# Patient Record
Sex: FEMALE | Race: WHITE | Employment: UNEMPLOYED | URBAN - METROPOLITAN AREA
[De-identification: names, ages, dates, MRNs, and addresses within clinical notes are randomized per-mention and may not be internally consistent; named-entity substitution may affect disease eponyms.]

---

## 2022-09-08 ENCOUNTER — TELEPHONE (OUTPATIENT)
Dept: INTERNAL MEDICINE CLINIC | Age: 42
End: 2022-09-08

## 2022-09-08 NOTE — TELEPHONE ENCOUNTER
Patient called about ulcer concerns. She stated that she has a history of ulcers and she has all of the associated symptoms. She also stated that she has a severe head cold. Her symptoms include excessive mucus, and upset stomach. Patient also said that she tested negative for COVID-19. Scheduled a car visit appointment for 9/12/22.

## 2022-10-11 ENCOUNTER — OFFICE VISIT (OUTPATIENT)
Dept: INTERNAL MEDICINE CLINIC | Age: 42
End: 2022-10-11
Payer: COMMERCIAL

## 2022-10-11 VITALS
OXYGEN SATURATION: 99 % | WEIGHT: 159.2 LBS | RESPIRATION RATE: 16 BRPM | BODY MASS INDEX: 28.21 KG/M2 | SYSTOLIC BLOOD PRESSURE: 130 MMHG | HEIGHT: 63 IN | DIASTOLIC BLOOD PRESSURE: 84 MMHG | TEMPERATURE: 98.4 F | HEART RATE: 80 BPM

## 2022-10-11 DIAGNOSIS — R13.10 PAINFUL SWALLOWING: ICD-10-CM

## 2022-10-11 DIAGNOSIS — F17.200 CURRENT EVERY DAY SMOKER: ICD-10-CM

## 2022-10-11 DIAGNOSIS — Z13.1 SCREENING FOR DIABETES MELLITUS: ICD-10-CM

## 2022-10-11 DIAGNOSIS — Z86.69 H/O ABSENCE SEIZURES: ICD-10-CM

## 2022-10-11 DIAGNOSIS — R53.83 FATIGUE, UNSPECIFIED TYPE: ICD-10-CM

## 2022-10-11 DIAGNOSIS — Z13.220 SCREENING FOR LIPID DISORDERS: ICD-10-CM

## 2022-10-11 DIAGNOSIS — K92.2 CHRONIC GI BLEEDING: Primary | ICD-10-CM

## 2022-10-11 PROCEDURE — 99204 OFFICE O/P NEW MOD 45 MIN: CPT | Performed by: NURSE PRACTITIONER

## 2022-10-11 RX ORDER — MONTELUKAST SODIUM 10 MG/1
10 TABLET ORAL DAILY
COMMUNITY

## 2022-10-11 RX ORDER — EPINEPHRINE 0.3 MG/.3ML
0.3 INJECTION SUBCUTANEOUS ONCE
Status: DISCONTINUED | OUTPATIENT
Start: 2022-10-11 | End: 2022-10-11

## 2022-10-11 RX ORDER — EPINEPHRINE 0.3 MG/.3ML
0.3 INJECTION SUBCUTANEOUS
Qty: 1 EACH | Refills: 1 | Status: SHIPPED | OUTPATIENT
Start: 2022-10-11 | End: 2022-10-11

## 2022-10-11 RX ORDER — PANTOPRAZOLE SODIUM 20 MG/1
20 TABLET, DELAYED RELEASE ORAL 2 TIMES DAILY
COMMUNITY

## 2022-10-11 RX ORDER — SUCRALFATE 1 G/10ML
SUSPENSION ORAL 4 TIMES DAILY
COMMUNITY

## 2022-10-11 NOTE — PROGRESS NOTES
Rj Castellanos is a 38 y.o. female  Chief Complaint   Patient presents with    New Patient     Patient thinks ulcers are bleeding - she has a history of that the past 13 years. Gastric bypass in 2009. Patient says she has black stools and pain in the abdomen. Ulcers in esophagus -painful to swallow. Patient said she has been throwing up. Patient cannot tolerate pain anymore    Medication Refill     Patient requesting epi-pen     Visit Vitals  BP (!) 134/91 (BP 1 Location: Left upper arm, BP Patient Position: Sitting, BP Cuff Size: Adult)   Pulse 80   Temp 98.4 °F (36.9 °C) (Temporal)   Resp 16   Ht 5' 3\" (1.6 m)   Wt 159 lb 3.2 oz (72.2 kg)   SpO2 99%   BMI 28.20 kg/m²          HPI  Patient is here to establish care  Moved here in Jan.   from [place]. Abdominal Pain started  2 months ago. ABDOMINAL PAIN- She reports severe abdominal pain 9/10. Rectal and upper GI bleeding, esophageal ulcers. Vomiting daily, Undigested food. Is currently on Carafate, Omeprazole and Protonix. On  x 8 yrs. Has not been stable. Cannot swallow. Painful ulcers. \"  Pain located  epigastric. Described as stabbing/ twisting. Several weeks of black greasy stool. No diarrhea or constipation. History  of gastric bypass 2009. Hospitalized for bleeding ulcer. 2 yrs ago. Attributed to Red bull/ stress       Current every day smoker. 1/2 ppd. No desire to quit today. Hypoglycemic-leads to   \"Absence seizure\"  passes out in shower. Never saw endocrinology RE BS. Zehra Dickerson Last seizure 8 months ago. Witnessed. Out for  minutes. No loss of bowel or bladder. No neuro evaluation      Request for EPI pen for allergy to shellfish. Being around shellfish. Exposure -Last month EMS called. 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 [employer]      ODALYS  Review of Systems   Constitutional:  Positive for malaise/fatigue. Negative for chills and fever. Respiratory:  Negative for cough and shortness of breath. Cardiovascular:  Negative for chest pain and palpitations. Gastrointestinal:  Positive for blood in stool, heartburn, melena, nausea and vomiting. Negative for abdominal pain, constipation and diarrhea. Genitourinary:  Negative for dysuria. Musculoskeletal:  Negative for myalgias. Skin:  Negative for itching and rash. Neurological:  Negative for dizziness and headaches. Psychiatric/Behavioral:  Negative for depression. The patient is not nervous/anxious. Past Medical History:   Diagnosis Date    Anemia     GERD (gastroesophageal reflux disease)     History of stomach ulcers 2010    Seizures ([address]      Past Surgical History:   Procedure Laterality Date    HX GASTRIC BYPASS  2009    HX TUBAL LIGATION  2008    IR CHOLECYSTOSTOMY PERCUTANEOUS  2001       EXAM  Physical Exam  Vitals and nursing note reviewed. Constitutional:       General: She is not in acute distress. Appearance: Normal appearance. HENT:      Head: Normocephalic and atraumatic. Right Ear: Tympanic membrane and ear canal normal.      Left Ear: Tympanic membrane and ear canal normal.      Mouth/Throat:      Mouth: Mucous membranes are moist.   Eyes:      Pupils: Pupils are equal, round, and reactive to light. Cardiovascular:      Rate and Rhythm: Normal rate and regular rhythm. Pulses: Normal pulses. Heart sounds: Normal heart sounds. Pulmonary:      Effort: Pulmonary effort is normal.      Breath sounds: Normal breath sounds. No wheezing or rhonchi. Abdominal:      General: Abdomen is protuberant. Bowel sounds are normal. There is no distension. Genitourinary:     Rectum: Guaiac result negative. External hemorrhoid present. Musculoskeletal:      Cervical back: Normal range of motion and neck supple. No tenderness. Lymphadenopathy:      Cervical: No cervical adenopathy. Skin:     General: Skin is warm.    Neurological:      General: No focal deficit present. Mental Status: She is alert. Psychiatric:         Mood and Affect: Mood normal.     ASSESSMENT/PLAN    1. Chronic GI bleeding  Continue current regimen of omeprazole/protonix/ carafate  - REFERRAL TO GASTROENTEROLOGY  - HEPATITIS C AB; Future  - AMB POC FECAL BLOOD, OCCULT, QL 1 CARD  Negative fecal occult blood    2. Painful swallowing  GI referral    3. Fatigue, unspecified type  - CBC WITH AUTOMATED DIFF; Future  - TSH 3RD GENERATION; Future  - HEPATITIS C AB; Future    4. Current every day smoker  Discussed cessation/ counseling given    5. Screening for diabetes mellitus  - METABOLIC PANEL, COMPREHENSIVE; Future    6. H/O absence seizures  stable  Consider neurology referral if reoccurs    7. Screening for lipid disorders  - LIPID PANEL;  Future    If symptoms get worse or you cannot get in with GI in next week/ go to ER for evaluation and treatment  Schedule Women's Health Visit PAP/ mammogram  Return 1 month  Signed By: JOLIE Camargo     October 11, 2022

## 2022-10-11 NOTE — PROGRESS NOTES
Chief Complaint   Patient presents with    New Patient     Patient thinks ulcers are bleeding - she has a history of that the past 13 years. Gastric bypass in 2009. Patient says she has black stools and pain in the abdomen. Ulcers in esophagus -painful to swallow. Patient said she has been throwing up. Patient cannot tolerate pain anymore    Medication Refill     Patient requesting epi-pen          Vitals:    10/11/22 1307   BP: (!) 134/91   Pulse: 80   Resp: 16   Temp: 98.4 °F (36.9 °C)   TempSrc: Temporal   SpO2: 99%   Weight: 159 lb 3.2 oz (72.2 kg)   Height: 5' 3\" (1.6 m)   PainSc:   9   PainLoc: Abdomen   LMP: 09/29/2022       Current Outpatient Medications on File Prior to Visit   Medication Sig Dispense Refill    sucralfate (Carafate) 100 mg/mL suspension Take  by mouth four (4) times daily. pantoprazole (Protonix) 20 mg tablet Take 20 mg by mouth two (2) times a day. OMEPRAZOLE PO Take 40 mg by mouth daily. montelukast (SINGULAIR) 10 mg tablet Take 10 mg by mouth daily. No current facility-administered medications on file prior to visit. Health Maintenance Due   Topic    Hepatitis C Screening     Depression Screen     COVID-19 Vaccine (1)    DTaP/Tdap/Td series (1 - Tdap)    Cervical cancer screen     Lipid Screen     Flu Vaccine (1)       1. \"Have you been to the ER, urgent care clinic since your last visit? Hospitalized since your last visit? \" No    2. \"Have you seen or consulted any other health care providers outside of the [address] [place] [address] since your last visit? \" No     3. For patients aged 39-70: Has the patient had a colonoscopy / FIT/ Cologuard? YES      If the patient is female:    4. For patients aged 41-77: Has the patient had a mammogram within the past 2 years? No      5. For patients aged 21-65: Has the patient had a pap smear?  No

## 2022-10-22 LAB
ALBUMIN SERPL-MCNC: 4.8 G/DL (ref 3.8–4.8)
ALBUMIN/GLOB SERPL: 2.3 {RATIO} (ref 1.2–2.2)
ALP SERPL-CCNC: 58 IU/L (ref 44–121)
ALT SERPL-CCNC: 11 IU/L (ref 0–32)
AST SERPL-CCNC: 28 IU/L (ref 0–40)
BASOPHILS # BLD AUTO: 0.1 X10E3/UL (ref 0–0.2)
BASOPHILS NFR BLD AUTO: 1 %
BILIRUB SERPL-MCNC: 0.5 MG/DL (ref 0–1.2)
BUN SERPL-MCNC: 4 MG/DL (ref 6–24)
BUN/CREAT SERPL: 4 (ref 9–23)
CALCIUM SERPL-MCNC: 9.2 MG/DL (ref 8.7–10.2)
CHLORIDE SERPL-SCNC: 104 MMOL/L (ref 96–106)
CHOLEST SERPL-MCNC: 170 MG/DL (ref 100–199)
CO2 SERPL-SCNC: 22 MMOL/L (ref 20–29)
CREAT SERPL-MCNC: 0.9 MG/DL (ref 0.57–1)
EGFR: 81 ML/MIN/1.73
EOSINOPHIL # BLD AUTO: 0.5 X10E3/UL (ref 0–0.4)
EOSINOPHIL NFR BLD AUTO: 7 %
ERYTHROCYTE [DISTWIDTH] IN BLOOD BY AUTOMATED COUNT: 16.1 % (ref 11.7–15.4)
GLOBULIN SER CALC-MCNC: 2.1 G/DL (ref 1.5–4.5)
GLUCOSE SERPL-MCNC: 95 MG/DL (ref 70–99)
HCT VFR BLD AUTO: 30.5 % (ref 34–46.6)
HCV AB S/CO SERPL IA: <0.1 S/CO RATIO (ref 0–0.9)
HDLC SERPL-MCNC: 60 MG/DL
HGB BLD-MCNC: 8.4 G/DL (ref 11.1–15.9)
IMM GRANULOCYTES # BLD AUTO: 0 X10E3/UL (ref 0–0.1)
IMM GRANULOCYTES NFR BLD AUTO: 0 %
IMP & REVIEW OF LAB RESULTS: NORMAL
LDLC SERPL CALC-MCNC: 91 MG/DL (ref 0–99)
LYMPHOCYTES # BLD AUTO: 2.1 X10E3/UL (ref 0.7–3.1)
LYMPHOCYTES NFR BLD AUTO: 32 %
MCH RBC QN AUTO: 19 PG (ref 26.6–33)
MCHC RBC AUTO-ENTMCNC: 27.5 G/DL (ref 31.5–35.7)
MCV RBC AUTO: 69 FL (ref 79–97)
MONOCYTES # BLD AUTO: 0.4 X10E3/UL (ref 0.1–0.9)
MONOCYTES NFR BLD AUTO: 6 %
NEUTROPHILS # BLD AUTO: 3.4 X10E3/UL (ref 1.4–7)
NEUTROPHILS NFR BLD AUTO: 54 %
PLATELET # BLD AUTO: 449 X10E3/UL (ref 150–450)
POTASSIUM SERPL-SCNC: 4.5 MMOL/L (ref 3.5–5.2)
PROT SERPL-MCNC: 6.9 G/DL (ref 6–8.5)
RBC # BLD AUTO: 4.43 X10E6/UL (ref 3.77–5.28)
SODIUM SERPL-SCNC: 138 MMOL/L (ref 134–144)
TRIGL SERPL-MCNC: 108 MG/DL (ref 0–149)
TSH SERPL DL<=0.005 MIU/L-ACNC: 0.79 UIU/ML (ref 0.45–4.5)
VLDLC SERPL CALC-MCNC: 19 MG/DL (ref 5–40)
WBC # BLD AUTO: 6.5 X10E3/UL (ref 3.4–10.8)

## 2022-11-11 ENCOUNTER — OFFICE VISIT (OUTPATIENT)
Dept: INTERNAL MEDICINE CLINIC | Age: 42
End: 2022-11-11
Payer: COMMERCIAL

## 2022-11-11 VITALS
BODY MASS INDEX: 28.63 KG/M2 | RESPIRATION RATE: 20 BRPM | TEMPERATURE: 98.2 F | OXYGEN SATURATION: 98 % | HEIGHT: 63 IN | SYSTOLIC BLOOD PRESSURE: 132 MMHG | HEART RATE: 103 BPM | DIASTOLIC BLOOD PRESSURE: 90 MMHG | WEIGHT: 161.6 LBS

## 2022-11-11 DIAGNOSIS — H53.9 VISION ABNORMALITIES: ICD-10-CM

## 2022-11-11 DIAGNOSIS — F17.200 CURRENT EVERY DAY SMOKER: ICD-10-CM

## 2022-11-11 DIAGNOSIS — R03.0 ELEVATED BLOOD-PRESSURE READING WITHOUT DIAGNOSIS OF HYPERTENSION: ICD-10-CM

## 2022-11-11 DIAGNOSIS — K92.2 CHRONIC GI BLEEDING: Primary | ICD-10-CM

## 2022-11-11 PROCEDURE — 99214 OFFICE O/P EST MOD 30 MIN: CPT | Performed by: NURSE PRACTITIONER

## 2022-11-11 RX ORDER — SUCRALFATE 1 G/10ML
1 SUSPENSION ORAL 4 TIMES DAILY
Qty: 1200 ML | Refills: 0 | Status: SHIPPED | OUTPATIENT
Start: 2022-11-11 | End: 2022-12-11

## 2022-11-11 RX ORDER — EPINEPHRINE 0.3 MG/.3ML
INJECTION SUBCUTANEOUS
COMMUNITY
Start: 2022-10-14

## 2022-11-11 NOTE — PROGRESS NOTES
Patient here for follow up, bloodwork. No chief complaint on file. There were no vitals filed for this visit. Current Outpatient Medications on File Prior to Visit   Medication Sig Dispense Refill    sucralfate (Carafate) 100 mg/mL suspension Take  by mouth four (4) times daily. pantoprazole (Protonix) 20 mg tablet Take 20 mg by mouth two (2) times a day. OMEPRAZOLE PO Take 40 mg by mouth daily. montelukast (SINGULAIR) 10 mg tablet Take 10 mg by mouth daily. No current facility-administered medications on file prior to visit. Health Maintenance Due   Topic    COVID-19 Vaccine (1)    Pneumococcal 0-64 years (1 - PCV)    DTaP/Tdap/Td series (1 - Tdap)    Cervical cancer screen     Flu Vaccine (1)       1. \"Have you been to the ER, urgent care clinic since your last visit? Hospitalized since your last visit? \"  Yes, [facility], [facility], Eye Problem    2. \"Have you seen or consulted any other health care providers outside of the [address] [place] [address] since your last visit? \" No     3. For patients aged 39-70: Has the patient had a colonoscopy / FIT/ Cologuard? No      If the patient is female:    4. For patients aged 41-77: Has the patient had a mammogram within the past 2 years? No      5. For patients aged 21-65: Has the patient had a pap smear?  No

## 2022-11-11 NOTE — PROGRESS NOTES
Luisa Sweeney is a 38 y.o. female  No chief complaint on file. Visit Vitals  BP (!) 138/94 (BP 1 Location: Left upper arm, BP Patient Position: Sitting, BP Cuff Size: Adult)   Pulse (!) 103   Temp 98.2 °F (36.8 °C) (Oral)   Resp 20   Ht 5' 3\" (1.6 m)   Wt 161 lb 9.6 oz (73.3 kg)   SpO2 98%   BMI 28.63 kg/m²          HPI  Patient here with   Last visit was having severe abdominal pain / bleeding. Was referred to GI but never called. Pain lessened. 3 weeks ago  10/20 she reports she was at work . Couldn't see. Does wear contacts. [employer] [place]. Could not see computer. Went to her  eye doctor. Was told to get checked for diabetes. Dilated eye exam normal..  She states her Prescription went  severely worse. Went to walk in center/ [facility]- was told to see opthalmology  Had blood work done. Normal.  10/20 . Back to regular now but it Lasted 4  days. Other symptoms were Headache. Elevated blood pressure. Was not referred at [facility].   Headaches come and go. Located temples. Does not take anything. Currently headache free. She reports extreme fatigue. Reviewed labs. Elevated blood pressure-  Smoker/ does not want to quit  Health maintenance to be reviewed later    ROS  Review of Systems   Constitutional:  Positive for malaise/fatigue. HENT:  Negative for congestion and sore throat. Eyes:  Positive for blurred vision. Negative for photophobia and pain. Respiratory:  Negative for cough. Cardiovascular:  Negative for chest pain and palpitations. Gastrointestinal:  Positive for constipation and diarrhea. Negative for nausea and vomiting. Musculoskeletal:  Negative for myalgias. Neurological:  Positive for headaches. Negative for dizziness. EXAM  Physical Exam  Vitals and nursing note reviewed. Constitutional:       General: She is not in acute distress. Appearance: Normal appearance. HENT:      Head: Normocephalic and atraumatic.       Nose: Nose normal.   Eyes:      Pupils: Pupils are equal, round, and reactive to light. Cardiovascular:      Rate and Rhythm: Regular rhythm. Tachycardia present. Heart sounds: Normal heart sounds. Pulmonary:      Effort: Pulmonary effort is normal.      Breath sounds: No wheezing or rhonchi. Abdominal:      Palpations: Abdomen is soft. Musculoskeletal:         General: Normal range of motion. Cervical back: Normal range of motion. No tenderness. Lymphadenopathy:      Cervical: No cervical adenopathy. Skin:     General: Skin is warm. Neurological:      General: No focal deficit present. Mental Status: She is alert. Psychiatric:         Mood and Affect: Mood normal.   Lab Results   Component Value Date/Time    HGB 8.4 (L) 10/21/2022 10:32 AM       ASSESSMENT/PLAN        ICD-10-CM ICD-9-CM    1. Chronic GI bleeding  K92.2 578.9 CBC WITH AUTOMATED DIFF      IRON PROFILE  Call GI today for appt      2. Current every day smoker  F17.200 305.1 Will address next visit again      3. Elevated blood-pressure reading without diagnosis of hypertension  R03.0 796.2 Take BP at home and record  Return 1 month. If above 130/70 we are starting medication      4. Vision abnormalities  H53.9 368.9 Resolved.  If this re-occurs/ go to ER      RETURN 1 MONTH  Signed By: JOLIE Calle     November 11, 2022

## 2022-11-18 LAB
BASOPHILS # BLD AUTO: 0.1 X10E3/UL (ref 0–0.2)
BASOPHILS NFR BLD AUTO: 1 %
EOSINOPHIL # BLD AUTO: 0.1 X10E3/UL (ref 0–0.4)
EOSINOPHIL NFR BLD AUTO: 2 %
ERYTHROCYTE [DISTWIDTH] IN BLOOD BY AUTOMATED COUNT: 16 % (ref 11.7–15.4)
HCT VFR BLD AUTO: 31.7 % (ref 34–46.6)
HGB BLD-MCNC: 8.4 G/DL (ref 11.1–15.9)
IMM GRANULOCYTES # BLD AUTO: 0 X10E3/UL (ref 0–0.1)
IMM GRANULOCYTES NFR BLD AUTO: 0 %
IRON SATN MFR SERPL: 4 % (ref 15–55)
IRON SERPL-MCNC: 17 UG/DL (ref 27–159)
LYMPHOCYTES # BLD AUTO: 2.2 X10E3/UL (ref 0.7–3.1)
LYMPHOCYTES NFR BLD AUTO: 31 %
MCH RBC QN AUTO: 18.4 PG (ref 26.6–33)
MCHC RBC AUTO-ENTMCNC: 26.5 G/DL (ref 31.5–35.7)
MCV RBC AUTO: 70 FL (ref 79–97)
MONOCYTES # BLD AUTO: 0.4 X10E3/UL (ref 0.1–0.9)
MONOCYTES NFR BLD AUTO: 5 %
NEUTROPHILS # BLD AUTO: 4.3 X10E3/UL (ref 1.4–7)
NEUTROPHILS NFR BLD AUTO: 61 %
PLATELET # BLD AUTO: 366 X10E3/UL (ref 150–450)
RBC # BLD AUTO: 4.56 X10E6/UL (ref 3.77–5.28)
TIBC SERPL-MCNC: 460 UG/DL (ref 250–450)
UIBC SERPL-MCNC: 443 UG/DL (ref 131–425)
WBC # BLD AUTO: 7 X10E3/UL (ref 3.4–10.8)

## 2022-11-21 NOTE — PROGRESS NOTES
Spoke with Khloe Gilmore she stated she has appointment with GI on Jan 25, 2023, that was the earliest. She understood she should make appointment to see hematologist and if she feels worst to go to the ER.

## 2022-11-21 NOTE — PROGRESS NOTES
Please call patient. .. Please make sure she made appt with GI. Her Hgb is still very low. Has not changed from last month. She should also make an appointment with a hematologist as well. If she is feeling worse. .. GO to [facility]

## 2022-12-14 ENCOUNTER — OFFICE VISIT (OUTPATIENT)
Dept: INTERNAL MEDICINE CLINIC | Age: 42
End: 2022-12-14
Payer: COMMERCIAL

## 2022-12-14 VITALS
BODY MASS INDEX: 28.35 KG/M2 | OXYGEN SATURATION: 97 % | HEART RATE: 64 BPM | TEMPERATURE: 97.7 F | WEIGHT: 160 LBS | DIASTOLIC BLOOD PRESSURE: 80 MMHG | SYSTOLIC BLOOD PRESSURE: 138 MMHG | HEIGHT: 63 IN

## 2022-12-14 DIAGNOSIS — F17.200 CURRENT EVERY DAY SMOKER: ICD-10-CM

## 2022-12-14 DIAGNOSIS — B34.9 VIRAL ILLNESS: ICD-10-CM

## 2022-12-14 DIAGNOSIS — K92.2 CHRONIC GI BLEEDING: Primary | ICD-10-CM

## 2022-12-14 DIAGNOSIS — D50.0 IRON DEFICIENCY ANEMIA DUE TO CHRONIC BLOOD LOSS: ICD-10-CM

## 2022-12-14 DIAGNOSIS — I10 ESSENTIAL HYPERTENSION: ICD-10-CM

## 2022-12-14 LAB
QUICKVUE INFLUENZA TEST: NEGATIVE
VALID INTERNAL CONTROL?: YES

## 2022-12-14 PROCEDURE — 87804 INFLUENZA ASSAY W/OPTIC: CPT | Performed by: NURSE PRACTITIONER

## 2022-12-14 PROCEDURE — 99214 OFFICE O/P EST MOD 30 MIN: CPT | Performed by: NURSE PRACTITIONER

## 2022-12-14 RX ORDER — FERROUS SULFATE 325(65) MG
325 TABLET, DELAYED RELEASE (ENTERIC COATED) ORAL
Qty: 90 TABLET | Refills: 2 | Status: SHIPPED | OUTPATIENT
Start: 2022-12-14 | End: 2023-01-13

## 2022-12-14 RX ORDER — ALBUTEROL SULFATE 90 UG/1
AEROSOL, METERED RESPIRATORY (INHALATION)
COMMUNITY
Start: 2022-11-25

## 2022-12-14 NOTE — PROGRESS NOTES
Carmela Tabor is a 38 y.o. female  Chief Complaint   Patient presents with    Follow-up     Visit Vitals  /78 (BP 1 Location: Left upper arm, BP Patient Position: Sitting, BP Cuff Size: Adult)   Pulse 64   Temp 97.7 °F (36.5 °C) (Temporal)   Ht 5' 3\" (1.6 m)   Wt 160 lb (72.6 kg)   SpO2 97%   BMI 28.34 kg/m²          HPI  Patient is here for follow up of blood pressure and anemia  Reviewed last labs  Lab Results   Component Value Date/Time    HGB 8.4 (L) 11/17/2022 10:22 AM     Lab Results   Component Value Date/Time    Iron 17 (L) 11/17/2022 10:22 AM    TIBC 460 (H) 11/17/2022 10:22 AM    Iron % saturation 4 (LL) 11/17/2022 10:22 AM     Patient has 2 appts in Jan 6th and 26th with GI. Attempted to get appt with hematology. \"Wanted a referral\"  Patient reports she had iron and blood transfusions in [place]. Hypertension-  Taking blood pressure at home 3 times a day. 140/70-90    Concerns today- 2 weeks ago  had upper resp illness. Fever. Went to Urgent care-  tested for flu and covid. Negative Zpak and inhaler. 2 nights ago  started with high fever again- 103. Chills, congestion, cough. Taking Mucinex. Body aches. Headache. Not vaccinated for flu or COVID. SO also ill with same. Did not do home COVID test.  Continues to smoke but \"cutting down\"    ROS  Review of Systems   Constitutional:  Positive for chills, diaphoresis, fever and malaise/fatigue. HENT:  Positive for congestion. Negative for sore throat. Respiratory:  Positive for cough and sputum production. Negative for shortness of breath. Gastrointestinal:  Negative for abdominal pain, blood in stool, nausea and vomiting. Genitourinary:  Negative for dysuria. Musculoskeletal:  Positive for myalgias. Neurological:  Positive for headaches. EXAM  Physical Exam  Vitals and nursing note reviewed. Constitutional:       General: She is not in acute distress. Appearance: Normal appearance. She is normal weight. HENT:      Head: Normocephalic and atraumatic. Nose: Congestion present. Mouth/Throat:      Mouth: Mucous membranes are moist.      Pharynx: No oropharyngeal exudate. Eyes:      Pupils: Pupils are equal, round, and reactive to light. Cardiovascular:      Rate and Rhythm: Normal rate and regular rhythm. Pulses: Normal pulses. Pulmonary:      Effort: Pulmonary effort is normal.      Breath sounds: Normal breath sounds. Musculoskeletal:         General: Normal range of motion. Cervical back: Normal range of motion. No tenderness. Skin:     General: Skin is warm. Neurological:      General: No focal deficit present. Mental Status: She is alert. Psychiatric:         Mood and Affect: Mood normal.   Results for orders placed or performed in visit on 12/14/22   AMB POC RAPID INFLUENZA TEST   Result Value Ref Range    VALID INTERNAL CONTROL POC Yes     QuickVue Influenza test Negative Negative       ASSESSMENT/PLAN    ICD-10-CM ICD-9-CM    1. Chronic GI bleeding  K92.2 578.9 REFERRAL TO HEMATOLOGY. Pt preference [facility]. most recent labs printed. 2. Current every day smoker  F17.200 305.1 Encouraged to quit. Counseled each visit      3. Essential hypertension  I10 401.9 BP repeat WNL. Continue to take readings at home. 4. Viral illness  B34.9 079.99 NOVEL CORONAVIRUS (COVID-19)      AMB POC RAPID INFLUENZA TEST  Negative       5. Iron deficiency anemia due to chronic blood loss  D50.0 280.0 REFERRAL TO HEMATOLOGY  Take daily iron supplement 3 times a day with orange juice. Return Feb for follow up.   Signed By: JOLIE Collier     December 14, 2022

## 2022-12-14 NOTE — PROGRESS NOTES
Identified pt with two pt identifiers. Reviewed record in preparation for visit and have obtained necessary documentation. All patient medications has been reviewed. Chief Complaint   Patient presents with    Follow-up     Additional information about chief complaint:    Visit Vitals  /78 (BP 1 Location: Left upper arm, BP Patient Position: Sitting, BP Cuff Size: Adult)   Pulse 64   Temp 97.7 °F (36.5 °C) (Temporal)   Ht 5' 3\" (1.6 m)   Wt 160 lb (72.6 kg)   SpO2 97%   BMI 28.34 kg/m²       Health Maintenance Due   Topic    COVID-19 Vaccine (1)    Pneumococcal 0-64 years (1 - PCV)    DTaP/Tdap/Td series (1 - Tdap)    Cervical cancer screen     Flu Vaccine (1)       1. Have you been to the ER, urgent care clinic since your last visit? Hospitalized since your last visit? no    2. Have you seen or consulted any other health care providers outside of the [address] [place] [address] since your last visit? Include any pap smears or colon screening.  no

## 2022-12-14 NOTE — LETTER
NOTIFICATION RETURN TO WORK / SCHOOL    12/14/2022 11:27 AM    Ms. Rodrigo Cabrera  [address] [facility]  [place] [address]    Office Visit on 11/11/2022   Component Date Value Ref Range Status    WBC 11/17/2022 7.0  3.4 - 10.8 x10E3/uL Final    RBC 11/17/2022 4.56  3.77 - 5.28 x10E6/uL Final    HGB 11/17/2022 8.4 (A)  11.1 - 15.9 g/dL Final    HCT 11/17/2022 31.7 (A)  34.0 - 46.6 % Final    MCV 11/17/2022 70 (A)  79 - 97 fL Final    MCH 11/17/2022 18.4 (A)  26.6 - 33.0 pg Final    MCHC 11/17/2022 26.5 (A)  31.5 - 35.7 g/dL Final    RDW 11/17/2022 16.0 (A)  11.7 - 15.4 % Final    PLATELET 32/30/6417 975  150 - 450 x10E3/uL Final    NEUTROPHILS 11/17/2022 61  Not Estab. % Final    Lymphocytes 11/17/2022 31  Not Estab. % Final    MONOCYTES 11/17/2022 5  Not Estab. % Final    EOSINOPHILS 11/17/2022 2  Not Estab. % Final    BASOPHILS 11/17/2022 1  Not Estab. % Final    ABS. NEUTROPHILS 11/17/2022 4.3  1.4 - 7.0 x10E3/uL Final    Abs Lymphocytes 11/17/2022 2.2  0.7 - 3.1 x10E3/uL Final    ABS. MONOCYTES 11/17/2022 0.4  0.1 - 0.9 x10E3/uL Final    ABS. EOSINOPHILS 11/17/2022 0.1  0.0 - 0.4 x10E3/uL Final    ABS. BASOPHILS 11/17/2022 0.1  0.0 - 0.2 x10E3/uL Final    IMMATURE GRANULOCYTES 11/17/2022 0  Not Estab. % Final    ABS. IMM. GRANS.  11/17/2022 0.0  0.0 - 0.1 x10E3/uL Final    TIBC 11/17/2022 460 (A)  250 - 450 ug/dL Final    UIBC 11/17/2022 443 (A)  131 - 425 ug/dL Final    Iron 11/17/2022 17 (A)  27 - 159 ug/dL Final    Iron % saturation 11/17/2022 4 (A)  15 - 55 % Final   Office Visit on 10/11/2022   Component Date Value Ref Range Status    WBC 10/21/2022 6.5  3.4 - 10.8 x10E3/uL Final    RBC 10/21/2022 4.43  3.77 - 5.28 x10E6/uL Final    HGB 10/21/2022 8.4 (A)  11.1 - 15.9 g/dL Final    HCT 10/21/2022 30.5 (A)  34.0 - 46.6 % Final    MCV 10/21/2022 69 (A)  79 - 97 fL Final    MCH 10/21/2022 19.0 (A)  26.6 - 33.0 pg Final    MCHC 10/21/2022 27.5 (A)  31.5 - 35.7 g/dL Final    RDW 10/21/2022 16.1 (A)  11.7 - 15.4 % Final    PLATELET 30/03/5693 403  150 - 450 x10E3/uL Final    NEUTROPHILS 10/21/2022 54  Not Estab. % Final    Lymphocytes 10/21/2022 32  Not Estab. % Final    MONOCYTES 10/21/2022 6  Not Estab. % Final    EOSINOPHILS 10/21/2022 7  Not Estab. % Final    BASOPHILS 10/21/2022 1  Not Estab. % Final    ABS. NEUTROPHILS 10/21/2022 3.4  1.4 - 7.0 x10E3/uL Final    Abs Lymphocytes 10/21/2022 2.1  0.7 - 3.1 x10E3/uL Final    ABS. MONOCYTES 10/21/2022 0.4  0.1 - 0.9 x10E3/uL Final    ABS. EOSINOPHILS 10/21/2022 0.5 (A)  0.0 - 0.4 x10E3/uL Final    ABS. BASOPHILS 10/21/2022 0.1  0.0 - 0.2 x10E3/uL Final    IMMATURE GRANULOCYTES 10/21/2022 0  Not Estab. % Final    ABS. IMM. GRANS. 10/21/2022 0.0  0.0 - 0.1 x10E3/uL Final    Glucose 10/21/2022 95  70 - 99 mg/dL Final    BUN 10/21/2022 4 (A)  6 - 24 mg/dL Final    Creatinine 10/21/2022 0.90  0.57 - 1.00 mg/dL Final    eGFR 10/21/2022 81  >59 mL/min/1.73 Final    BUN/Creatinine ratio 10/21/2022 4 (A)  9 - 23 Final    Sodium 10/21/2022 138  134 - 144 mmol/L Final    Potassium 10/21/2022 4.5  3.5 - 5.2 mmol/L Final    Chloride 10/21/2022 104  96 - 106 mmol/L Final    CO2 10/21/2022 22  20 - 29 mmol/L Final    Calcium 10/21/2022 9.2  8.7 - 10.2 mg/dL Final    Protein, total 10/21/2022 6.9  6.0 - 8.5 g/dL Final    Albumin 10/21/2022 4.8  3.8 - 4.8 g/dL Final    GLOBULIN, TOTAL 10/21/2022 2.1  1.5 - 4.5 g/dL Final    A-G Ratio 10/21/2022 2.3 (A)  1.2 - 2.2 Final    Bilirubin, total 10/21/2022 0.5  0.0 - 1.2 mg/dL Final    Alk.  phosphatase 10/21/2022 58  44 - 121 IU/L Final    AST (SGOT) 10/21/2022 28  0 - 40 IU/L Final    ALT (SGPT) 10/21/2022 11  0 - 32 IU/L Final    Cholesterol, total 10/21/2022 170  100 - 199 mg/dL Final    Triglyceride 10/21/2022 108  0 - 149 mg/dL Final    HDL Cholesterol 10/21/2022 60  >39 mg/dL Final    VLDL, calculated 10/21/2022 19  5 - 40 mg/dL Final    LDL, calculated 10/21/2022 91  0 - 99 mg/dL Final    TSH 10/21/2022 0.791  0.450 - 4.500 uIU/mL Final    Hep C Virus Ab 10/21/2022 <0.1  0.0 - 0.9 s/co ratio Final    Comment:                                   Negative:     < 0.8                               Indeterminate: 0.8 - 0.9                                    Positive:     > 0.9   HCV antibody alone does not differentiate between   previous resolved infection and active infection. The CDC and current clinical guidelines recommend   that a positive HCV antibody result be followed up   with an HCV RNA test to support the diagnosis of   acute HCV infection. [facility] offers Hepatitis C   Virus (HCV) RNA, Diagnosis, HELGA (538640) and   Hepatitis C Virus (HCV) Antibody with reflex to   Quantitative Real-time PCR (029863).  INTERPRETATION 10/21/2022 Note   Final    Supplemental report is available.                    JOLIE Ivan

## 2022-12-15 LAB
SARS-COV-2, NAA 2 DAY TAT: NORMAL
SARS-COV-2, NAA: NOT DETECTED

## 2023-01-09 ENCOUNTER — ANESTHESIA (OUTPATIENT)
Dept: ENDOSCOPY | Age: 43
End: 2023-01-09
Payer: COMMERCIAL

## 2023-01-09 ENCOUNTER — ANESTHESIA EVENT (OUTPATIENT)
Dept: ENDOSCOPY | Age: 43
End: 2023-01-09
Payer: COMMERCIAL

## 2023-01-09 ENCOUNTER — HOSPITAL ENCOUNTER (OUTPATIENT)
Age: 43
Setting detail: OUTPATIENT SURGERY
Discharge: HOME | End: 2023-01-09
Attending: INTERNAL MEDICINE | Admitting: INTERNAL MEDICINE
Payer: COMMERCIAL

## 2023-01-09 VITALS
HEIGHT: 63 IN | DIASTOLIC BLOOD PRESSURE: 74 MMHG | RESPIRATION RATE: 18 BRPM | WEIGHT: 155 LBS | OXYGEN SATURATION: 99 % | HEART RATE: 89 BPM | BODY MASS INDEX: 27.46 KG/M2 | TEMPERATURE: 99.8 F | SYSTOLIC BLOOD PRESSURE: 117 MMHG

## 2023-01-09 LAB
GLUCOSE BLD STRIP.AUTO-MCNC: 99 MG/DL (ref 65–117)
HCG UR QL: NEGATIVE
SERVICE CMNT-IMP: NORMAL

## 2023-01-09 PROCEDURE — 76060000031 HC ANESTHESIA FIRST 0.5 HR: Performed by: INTERNAL MEDICINE

## 2023-01-09 PROCEDURE — 74011000250 HC RX REV CODE- 250: Performed by: NURSE ANESTHETIST, CERTIFIED REGISTERED

## 2023-01-09 PROCEDURE — 77030021593 HC FCPS BIOP ENDOSC BSC -A: Performed by: INTERNAL MEDICINE

## 2023-01-09 PROCEDURE — 76040000019: Performed by: INTERNAL MEDICINE

## 2023-01-09 PROCEDURE — 74011250636 HC RX REV CODE- 250/636: Performed by: NURSE ANESTHETIST, CERTIFIED REGISTERED

## 2023-01-09 PROCEDURE — 2709999900 HC NON-CHARGEABLE SUPPLY: Performed by: INTERNAL MEDICINE

## 2023-01-09 PROCEDURE — 88305 TISSUE EXAM BY PATHOLOGIST: CPT

## 2023-01-09 PROCEDURE — 82962 GLUCOSE BLOOD TEST: CPT

## 2023-01-09 PROCEDURE — 81025 URINE PREGNANCY TEST: CPT

## 2023-01-09 RX ORDER — FLUMAZENIL 0.1 MG/ML
0.2 INJECTION INTRAVENOUS
Status: DISCONTINUED | OUTPATIENT
Start: 2023-01-09 | End: 2023-01-09 | Stop reason: HOSPADM

## 2023-01-09 RX ORDER — PROPOFOL 10 MG/ML
INJECTION, EMULSION INTRAVENOUS AS NEEDED
Status: DISCONTINUED | OUTPATIENT
Start: 2023-01-09 | End: 2023-01-09 | Stop reason: HOSPADM

## 2023-01-09 RX ORDER — EPINEPHRINE 0.1 MG/ML
1 INJECTION INTRACARDIAC; INTRAVENOUS
Status: DISCONTINUED | OUTPATIENT
Start: 2023-01-09 | End: 2023-01-09 | Stop reason: HOSPADM

## 2023-01-09 RX ORDER — SODIUM CHLORIDE 0.9 % (FLUSH) 0.9 %
5-40 SYRINGE (ML) INJECTION EVERY 8 HOURS
Status: DISCONTINUED | OUTPATIENT
Start: 2023-01-09 | End: 2023-01-09 | Stop reason: HOSPADM

## 2023-01-09 RX ORDER — SODIUM CHLORIDE 9 MG/ML
INJECTION, SOLUTION INTRAVENOUS
Status: DISCONTINUED | OUTPATIENT
Start: 2023-01-09 | End: 2023-01-09 | Stop reason: HOSPADM

## 2023-01-09 RX ORDER — SODIUM CHLORIDE 9 MG/ML
50 INJECTION, SOLUTION INTRAVENOUS CONTINUOUS
Status: DISCONTINUED | OUTPATIENT
Start: 2023-01-09 | End: 2023-01-09 | Stop reason: HOSPADM

## 2023-01-09 RX ORDER — DEXTROMETHORPHAN/PSEUDOEPHED 2.5-7.5/.8
1.2 DROPS ORAL
Status: DISCONTINUED | OUTPATIENT
Start: 2023-01-09 | End: 2023-01-09 | Stop reason: HOSPADM

## 2023-01-09 RX ORDER — LIDOCAINE HYDROCHLORIDE 20 MG/ML
INJECTION, SOLUTION INFILTRATION; PERINEURAL AS NEEDED
Status: DISCONTINUED | OUTPATIENT
Start: 2023-01-09 | End: 2023-01-09 | Stop reason: HOSPADM

## 2023-01-09 RX ORDER — NALOXONE HYDROCHLORIDE 0.4 MG/ML
0.4 INJECTION, SOLUTION INTRAMUSCULAR; INTRAVENOUS; SUBCUTANEOUS
Status: DISCONTINUED | OUTPATIENT
Start: 2023-01-09 | End: 2023-01-09 | Stop reason: HOSPADM

## 2023-01-09 RX ORDER — SODIUM CHLORIDE 0.9 % (FLUSH) 0.9 %
5-40 SYRINGE (ML) INJECTION AS NEEDED
Status: DISCONTINUED | OUTPATIENT
Start: 2023-01-09 | End: 2023-01-09 | Stop reason: HOSPADM

## 2023-01-09 RX ORDER — ATROPINE SULFATE 0.1 MG/ML
0.5 INJECTION INTRAVENOUS
Status: DISCONTINUED | OUTPATIENT
Start: 2023-01-09 | End: 2023-01-09 | Stop reason: HOSPADM

## 2023-01-09 RX ADMIN — LIDOCAINE HYDROCHLORIDE 40 MG: 20 INJECTION, SOLUTION INFILTRATION; PERINEURAL at 08:05

## 2023-01-09 RX ADMIN — SODIUM CHLORIDE: 900 INJECTION, SOLUTION INTRAVENOUS at 07:50

## 2023-01-09 RX ADMIN — PROPOFOL 30 MG: 10 INJECTION, EMULSION INTRAVENOUS at 08:08

## 2023-01-09 RX ADMIN — PROPOFOL 150 MG: 10 INJECTION, EMULSION INTRAVENOUS at 08:05

## 2023-01-09 RX ADMIN — PROPOFOL 50 MG: 10 INJECTION, EMULSION INTRAVENOUS at 08:07

## 2023-01-09 NOTE — ANESTHESIA PREPROCEDURE EVALUATION
Relevant Problems   No relevant active problems       Anesthetic History   No history of anesthetic complications            Review of Systems / Medical History  Patient summary reviewed, nursing notes reviewed and pertinent labs reviewed    Pulmonary            Asthma : well controlled       Neuro/Psych              Cardiovascular  Within defined limits                Exercise tolerance: >4 METS     GI/Hepatic/Renal     GERD: well controlled           Endo/Other  Within defined limits           Other Findings              Physical Exam    Airway  Mallampati: II  TM Distance: > 6 cm  Neck ROM: normal range of motion   Mouth opening: Normal     Cardiovascular  Regular rate and rhythm,  S1 and S2 normal,  no murmur, click, rub, or gallop  Rhythm: regular  Rate: normal         Dental  No notable dental hx       Pulmonary  Breath sounds clear to auscultation               Abdominal  GI exam deferred       Other Findings            Anesthetic Plan    ASA: 2  Anesthesia type: MAC          Induction: Intravenous  Anesthetic plan and risks discussed with: Patient

## 2023-01-09 NOTE — H&P
Pre-Endoscopy H&P   Chief complaint: melena    HPI:  Patient presents for procedure. The indication for the procedure, the patient's history and the patient's current medications are reviewed prior to the procedure and that data is reported on the endoscopy note in the chart to which this document is attached. Any significant complaints with regard to organ systems will be noted, and if not mentioned then a review of systems is not contributory. Past Medical History:   Diagnosis Date    Anemia     GERD (gastroesophageal reflux disease)     History of stomach ulcers 2010    Hypoglycemia     Seizures ([address]     per pt, absence seizures      Past Surgical History:   Procedure Laterality Date    HX GASTRIC BYPASS  2009    HX TUBAL LIGATION  2008    IR CHOLECYSTOSTOMY PERCUTANEOUS  2001     Social   Social History     Tobacco Use    Smoking status: Every Day     Packs/day: 0.50     Types: Cigarettes     Start date: 2014     Passive exposure: Past    Smokeless tobacco: Never   Substance Use Topics    Alcohol use: Yes     Alcohol/week: 1.0 standard drink     Types: 1 Standard drinks or equivalent per week      Family History   Problem Relation Age of Onset    Lung Cancer Mother     Cervical Cancer Mother     Bilateral breast cancer Mother     Heart Disease Father     Heart Attack Father       Allergies   Allergen Reactions    Penicillins Hives    Shellfish Derived Anaphylaxis    Tramadol Anaphylaxis    Vicodin [Hydrocodone-Acetaminophen] Anaphylaxis      Prior to Admission Medications   Prescriptions Last Dose Informant Patient Reported? Taking? EPINEPHrine (EPIPEN) 0.3 mg/0.3 mL injection   Yes No   Sig: INJECT INTRAMUSCULARLY THE CONTENTS OF 1 PEN AS NEEDED FOR ALLERGIC REACTION OR ANAPHYLAXIS FOR UP TO 1 DOSE   OMEPRAZOLE PO 12/9/2022  Yes Yes   Sig: Take 40 mg by mouth daily.    albuterol (PROVENTIL HFA, VENTOLIN HFA, PROAIR HFA) 90 mcg/actuation inhaler Not Taking  Yes No   Sig: INHALE 2 PUFFS BY MOUTH EVERY 4 HOURS AS NEEDED   Patient not taking: Reported on 1/9/2023   ferrous sulfate (IRON) 325 mg (65 mg iron) EC tablet 1/8/2023  No Yes   Sig: Take 1 Tablet by mouth three (3) times daily (with meals) for 30 days. montelukast (SINGULAIR) 10 mg tablet Not Taking  Yes No   Sig: Take 10 mg by mouth daily. Patient not taking: Reported on 1/9/2023      Facility-Administered Medications: None       PHYSICAL EXAM:  The patient is examined immediately prior to the procedure. Visit Vitals  /84   Pulse 67   Resp 20   Ht 5' 3\" (1.6 m)   Wt 70.3 kg (155 lb)   SpO2 100%   BMI 27.46 kg/m²     Gen: Appears comfortable, no distress. Pulm: comfortable respirations with no abnormal audible breath sounds  CV: heart regular, well perfused  GI: abdomen flat. ASSESSMENT:  Patient here for procedure. The indication for the procedure, the patient's history and the patient's current medications are reviewed prior to the procedure and that data is reported on the endoscopy note in the chart to which this document is attached. PLAN:  Informed consent discussion held, patient afforded an opportunity to ask questions and all questions answered. After being advised of the risks, benefits, and alternatives, the patient requested that we proceed and indicated so on a written consent form. Will proceed with procedure as planned.   Ashlyn Smith MD

## 2023-01-09 NOTE — DISCHARGE INSTRUCTIONS
[id number] [facility] Jasmeet Cabrera MD  [phone]      January 9, 2023     Noah Faust  YOB: 1980    ENDOSCOPY DISCHARGE INSTRUCTIONS    If there is redness at IV site you should apply warm compress to area. If redness or soreness persist contact Dr. Elvi Cabrera or your primary care doctor. Gaseous discomfort may develop, but walking, belching will help relieve this. You may not operate a vehicle for 12 hours  You may not operate machinery or dangerous appliances for rest of today  You may not drink alcoholic beverages for 12 hours  Avoid making any critical decisions for 24 hours    DIET:  You may resume your normal diet, but some patients find that heavy or large meals may lead to indigestion or vomiting. I suggest a light meal as first food intake. MEDICATIONS:  The use of some over-the-counter pain medication may lead to bleeding after biopsies or other procedures you may have had done. Tylenol (also called acetaminophen) is safe to take and will not lead to bleeding. Based on the procedure you can resume baby-dose aspirin (81 mg) and may safely take anticoagulation (like apixaban (Eliquis), dabigatran (Pradaxa), edoxaban (Peyton Lipoma), rivaroxaban (Xarelto) or warfarin (Coumadin)) or antiplatelet medications (high dose aspirin 325mg, Clopidogrel (Plavix), Prasugrel (Effient), Ticagrelor (Brilinta))for the next two (48 hours) days. ACTIVITY:  You may resume your normal household activities, but it is recommended that you spend the remainder of the day resting -  avoid any strenuous activity. CALL DR. MYRICK'S OFFICE IF:  Increasing pain, nausea, vomiting  Abdominal distension (swelling)  Significant new or increased bleeding (oral or rectal)  Fever/Chills  Chest pain/shortness of breath                   Additional instructions:   Mildly abnormal patch of gastric pouch lining suspicious for recently healed ulcer. We advise continuing OMEPRAZOLE; will send a new Rx to your pharmacy. Will mail pathology report and any other further recommendations in 7-10 business days; if any urgent finding will contact you by phone. It was an honor to be your doctor today. Please let me or my office staff know if you have any feedback about today's procedure.     Freddie Caro MD, January 9, 2023

## 2023-01-09 NOTE — PROCEDURES
[address] [facility].  [address] [facility] [address], [address]  [phone]                            NAME:  Angeli Irizarry   :   1980   MRN:   [id number]     Date/Time:  2023 8:15 AM    Esophagogastroduodenoscopy (EGD) Procedure Note    :  Cecy Palma MD    Staff: Endoscopy Technician-1: Puma Ann  Endoscopy RN-1: Vania Shaver RN    Referring Provider:  JOLIE Field    Anethesia/Sedation:  MAC    Procedure Details   After infomed consent was obtained for the procedure, with all risks and benefits of procedure explained the patient was taken to the endoscopy suite and placed in the left lateral decubitus position. Following sequential administration of sedation as per above, the gastroscope was inserted into the mouth and advanced under direct vision to proximal jejunum. A careful inspection was made as the gastroscope was withdrawn, including a retroflexed view of the proximal stomach; findings and interventions are described below. Findings:  Esophagus: normal  Stomach: unremarkable gastric pouch staple line, no proximal lesion; in the distal pouch just before GJ inlet (not part of anastomosis) a 6x6mm depressed patch of atrophic mucosa with mild granularity and friability suspicious for recently healed ulcer, biopsies obtained to exclude neoplasia or dysplasia; random biopsies also obtained from pouch mucosa to exclude H pylori  GJ anastomosis unremarkable  Jejunum:normal proximal blind end, unremarkable to extent of exam (~20 cm distal to GJ anastomosis)           Specimens Removed:    ID Type Source Tests Collected by Time Destination   1 : Random Gastric Pouch Bx Preservative Gastric  Aruna Bob MD 2023 4882 Pathology   2 : Gastric Pouch Abnormal Mucosa Bx Preservative Gastric  Aruna Bob MD 2023 8399 Pathology       Complications: None.      EBL:  none    Impression:    See Postoperative diagnosis above    Recommendations:   - Await pathology evaluation of biopsy / resected tissue  - Possible recently healed gastric pouch ulcer causing melena  - Has not been taking PPI (pharmacy ran out?); will re-prescribe  - Resume previous diet.     Discharge disposition:  Home in the company of  when able to ambulate    Flor Holliday MD

## 2023-01-09 NOTE — ANESTHESIA POSTPROCEDURE EVALUATION
Post-Anesthesia Evaluation and Assessment    Patient: Den Del Real MRN: [id number]  SSN: [id number]    YOB: 1980  Age: 38 y.o. Sex: female      I have evaluated the patient and they are stable and ready for discharge from the PACU. Cardiovascular Function/Vital Signs  Visit Vitals  /74   Pulse 89   Temp 37.7 °C (99.8 °F)   Resp 18   Ht 5' 3\" (1.6 m)   Wt 70.3 kg (155 lb)   SpO2 99%   BMI 27.46 kg/m²       Patient is status post MAC anesthesia for Procedure(s):  ESOPHAGOGASTRODUODENOSCOPY (EGD)  ESOPHAGOGASTRODUODENAL (EGD) BIOPSY. Nausea/Vomiting: None    Postoperative hydration reviewed and adequate. Pain:  Pain Scale 1: Visual (01/09/23 0820)  Pain Intensity 1: 0 (01/09/23 0820)   Managed    Neurological Status: At baseline    Mental Status, Level of Consciousness: Alert and  oriented to person, place, and time    Pulmonary Status:   O2 Device: None (Room air) (01/09/23 0820)   Adequate oxygenation and airway patent    Complications related to anesthesia: None    Post-anesthesia assessment completed. No concerns    Signed By: Romy Mart MD     January 9, 2023              Procedure(s):  ESOPHAGOGASTRODUODENOSCOPY (EGD)  ESOPHAGOGASTRODUODENAL (EGD) BIOPSY. MAC    <BSHSIANPOST>    INITIAL Post-op Vital signs:   Vitals Value Taken Time   /74 01/09/23 0830   Temp 37.7 °C (99.8 °F) 01/09/23 0817   Pulse 83 01/09/23 0831   Resp 14 01/09/23 0831   SpO2 99 % 01/09/23 0831   Vitals shown include unvalidated device data.

## 2023-03-14 ENCOUNTER — OFFICE VISIT (OUTPATIENT)
Dept: INTERNAL MEDICINE CLINIC | Age: 43
End: 2023-03-14
Payer: COMMERCIAL

## 2023-03-14 VITALS
TEMPERATURE: 98.2 F | OXYGEN SATURATION: 99 % | HEART RATE: 81 BPM | DIASTOLIC BLOOD PRESSURE: 80 MMHG | BODY MASS INDEX: 28.35 KG/M2 | WEIGHT: 160 LBS | RESPIRATION RATE: 19 BRPM | HEIGHT: 63 IN | SYSTOLIC BLOOD PRESSURE: 119 MMHG

## 2023-03-14 DIAGNOSIS — K52.9 GASTROENTERITIS: ICD-10-CM

## 2023-03-14 DIAGNOSIS — B34.9 VIRAL ILLNESS: Primary | ICD-10-CM

## 2023-03-14 DIAGNOSIS — Z20.822 CONTACT WITH AND (SUSPECTED) EXPOSURE TO COVID-19: ICD-10-CM

## 2023-03-14 DIAGNOSIS — R11.2 NAUSEA AND VOMITING, UNSPECIFIED VOMITING TYPE: ICD-10-CM

## 2023-03-14 PROBLEM — K62.5 RECTAL BLEEDING: Status: ACTIVE | Noted: 2023-03-14

## 2023-03-14 LAB
QUICKVUE INFLUENZA TEST: NEGATIVE
VALID INTERNAL CONTROL?: YES

## 2023-03-14 PROCEDURE — 87804 INFLUENZA ASSAY W/OPTIC: CPT | Performed by: NURSE PRACTITIONER

## 2023-03-14 PROCEDURE — 99213 OFFICE O/P EST LOW 20 MIN: CPT | Performed by: NURSE PRACTITIONER

## 2023-03-14 RX ORDER — FAMOTIDINE 20 MG/1
20 TABLET, FILM COATED ORAL
COMMUNITY
Start: 2023-01-09

## 2023-03-14 RX ORDER — PANTOPRAZOLE SODIUM 40 MG/1
TABLET, DELAYED RELEASE ORAL
COMMUNITY
Start: 2023-02-19

## 2023-03-14 RX ORDER — SUCRALFATE 1 G/10ML
SUSPENSION ORAL
COMMUNITY
Start: 2023-02-19

## 2023-03-14 RX ORDER — ONDANSETRON 4 MG/1
4 TABLET, ORALLY DISINTEGRATING ORAL
Qty: 15 TABLET | Refills: 0 | Status: SHIPPED | OUTPATIENT
Start: 2023-03-14 | End: 2023-03-19

## 2023-03-14 NOTE — PROGRESS NOTES
Yong Alvarez is a 38 y.o. female  Chief Complaint   Patient presents with    Dehydration     Pt states sx started Monday 3/6 presented as head cold congestion,runny nose and SOB and just generally not feeling well. Has had diarrhea and n/v since Wednesday 3/8     Visit Vitals  /80 (BP 1 Location: Left upper arm, BP Patient Position: Sitting, BP Cuff Size: Adult)   Pulse 81   Temp 98.2 °F (36.8 °C) (Temporal)   Resp 19   Ht 5' 3\" (1.6 m)   Wt 160 lb (72.6 kg)   SpO2 99%   BMI 28.34 kg/m²      Health Maintenance Due   Topic Date Due    COVID-19 Vaccine (1) Never done    Pneumococcal 0-64 years (1 - PCV) Never done    DTaP/Tdap/Td series (1 - Tdap) Never done    Cervical cancer screen  Never done    Flu Vaccine (1) Never done       HPI  Patient with a 7 day history of cough, congestion, fatigue,  Thursday 5 days ago  diarrhea, nausea vomiting. Diarrhea/ loose stool. Yellow. Water. Vomited this am. Bile. Started with a cough. Did not do a COVID test.   Exposure  Friday. 101. Started with head pain, fever, chills. T 101. Taking Dayquil. Tylenol. Not vaccinated for COVID      Past Medical History:   Diagnosis Date    Anemia     GERD (gastroesophageal reflux disease)     History of stomach ulcers 2010    Hypoglycemia     Seizures (HCC)     per pt, absence seizures       ROS  Review of Systems   Constitutional:  Positive for chills, diaphoresis, fever and malaise/fatigue. HENT:  Positive for congestion. Negative for ear pain and sore throat. Respiratory:  Positive for cough and sputum production. Cardiovascular:  Negative for palpitations. Gastrointestinal:  Positive for diarrhea, nausea and vomiting. Neurological:  Negative for dizziness and headaches. EXAM  Physical Exam  Vitals and nursing note reviewed. Constitutional:       General: She is not in acute distress. Appearance: Normal appearance. She is ill-appearing. She is not toxic-appearing.    HENT:      Head: Normocephalic and atraumatic. Right Ear: Tympanic membrane normal.      Left Ear: Tympanic membrane normal.      Nose: Nose normal.      Mouth/Throat:      Mouth: Mucous membranes are dry. Eyes:      Extraocular Movements: Extraocular movements intact. Pupils: Pupils are equal, round, and reactive to light. Cardiovascular:      Rate and Rhythm: Normal rate. Pulses: Normal pulses. Heart sounds: Normal heart sounds. Pulmonary:      Effort: Pulmonary effort is normal.      Breath sounds: Normal breath sounds. Abdominal:      General: There is no distension. Palpations: Abdomen is soft. Tenderness: There is no abdominal tenderness. There is no right CVA tenderness or left CVA tenderness. Musculoskeletal:         General: Normal range of motion. Cervical back: Normal range of motion and neck supple. Lymphadenopathy:      Cervical: No cervical adenopathy. Skin:     General: Skin is warm. Neurological:      General: No focal deficit present. Mental Status: She is alert. Psychiatric:         Mood and Affect: Mood normal.   Results for orders placed or performed in visit on 03/14/23   AMB POC RAPID INFLUENZA TEST   Result Value Ref Range    VALID INTERNAL CONTROL POC Yes     QuickVue Influenza test Negative Negative       ASSESSMENT/PLAN    ICD-10-CM ICD-9-CM    1. Viral illness  B34.9 079.99 AMB POC RAPID INFLUENZA TEST      NOVEL CORONAVIRUS (COVID-19)      2. Contact with and (suspected) exposure to covid-19  Z20.822 V01.79       3. Nausea and vomiting, unspecified vomiting type  R11.2 787.01       4. Gastroenteritis  K52.9 558.9 BRAT diet  Zofran PRN  Gatoraid/ pedialyte  If symptoms get worse/ go to er        Orders Placed This Encounter    NOVEL CORONAVIRUS (COVID-19) ([facility] Default)     Scheduling Instructions:      1) Due to current limited availability of the COVID-19 PCR test, tests will be prioritized and may not be completed.               2) Order only if the test result will change clinical management or necessary for a return to mission-critical employment decision. 3) Print and instruct patient to adhere to CDC home isolation program. (Link Above)              4) Set up or refer patient for a monitoring program.              5) Have patient sign up for and leverage MyChart (if not previously done). Order Specific Question:   Is this test for diagnosis or screening? Answer:   Diagnosis of ill patient     Order Specific Question:   Symptomatic for COVID-19 as defined by CDC? Answer:   Yes     Order Specific Question:   Date of Symptom Onset     Answer:   3/11/2023     Order Specific Question:   Hospitalized for COVID-19? Answer:   No     Order Specific Question:   Admitted to ICU for COVID-19? Answer:   No     Order Specific Question:   Employed in healthcare setting? Answer:   No     Order Specific Question:   Resident in a congregate (group) care setting? Answer:   No     Order Specific Question:   Pregnant? Answer:   No     Order Specific Question:   Previously tested for COVID-19? Answer: Yes    AMB POC RAPID INFLUENZA TEST    famotidine (PEPCID) 20 mg tablet     Si mg.    pantoprazole (PROTONIX) 40 mg tablet     Sig: TAKE 1 TABLET BY MOUTH TWICE DAILY UNTILL EGD    sucralfate (CARAFATE) 100 mg/mL suspension     Sig: TAKE 10 ML BY MOUTH TWICE DAILY    ondansetron (ZOFRAN ODT) 4 mg disintegrating tablet     Sig: Take 1 Tablet by mouth every eight (8) hours as needed for Nausea or Vomiting for up to 5 days.      Dispense:  15 Tablet     Refill:  0         Signed By: JOLIE Cordova     2023

## 2023-03-14 NOTE — PROGRESS NOTES
Chief Complaint   Patient presents with    Dehydration     Pt states sx started Monday 3/6 presented as head cold congestion,runny nose and SOB and just generally not feeling well. Has had diarrhea and n/v since Wednesday 3/8          Vitals:    03/14/23 1016   BP: 119/80   Pulse: 81   Resp: 19   Temp: 98.2 °F (36.8 °C)   TempSrc: Temporal   SpO2: 99%   Weight: 160 lb (72.6 kg)   Height: 5' 3\" (1.6 m)   PainSc:   5   PainLoc: Generalized       Current Outpatient Medications on File Prior to Visit   Medication Sig Dispense Refill    famotidine (PEPCID) 20 mg tablet 20 mg.      sucralfate (CARAFATE) 100 mg/mL suspension TAKE 10 ML BY MOUTH TWICE DAILY      albuterol (PROVENTIL HFA, VENTOLIN HFA, PROAIR HFA) 90 mcg/actuation inhaler       EPINEPHrine (EPIPEN) 0.3 mg/0.3 mL injection INJECT INTRAMUSCULARLY THE CONTENTS OF 1 PEN AS NEEDED FOR ALLERGIC REACTION OR ANAPHYLAXIS FOR UP TO 1 DOSE      OMEPRAZOLE PO Take 40 mg by mouth daily. montelukast (SINGULAIR) 10 mg tablet Take 10 mg by mouth daily. pantoprazole (PROTONIX) 40 mg tablet TAKE 1 TABLET BY MOUTH TWICE DAILY UNTILL EGD       No current facility-administered medications on file prior to visit. Health Maintenance Due   Topic    COVID-19 Vaccine (1)    Pneumococcal 0-64 years (1 - PCV)    DTaP/Tdap/Td series (1 - Tdap)    Cervical cancer screen     Flu Vaccine (1)       1. \"Have you been to the ER, urgent care clinic since your last visit? Hospitalized since your last visit? \" No    2. \"Have you seen or consulted any other health care providers outside of the [address] [place] [address] since your last visit? \" Yes Esequiel Fennel Dr Kenton Kanner K    3. For patients aged 39-70: Has the patient had a colonoscopy / FIT/ Cologuard? NA - based on age      If the patient is female:    4. For patients aged 41-77: Has the patient had a mammogram within the past 2 years? No      5. For patients aged 21-65: Has the patient had a pap smear?  No

## 2023-03-15 LAB — SARS-COV-2 RNA RESP QL NAA+PROBE: NOT DETECTED

## 2023-04-27 ENCOUNTER — OFFICE VISIT (OUTPATIENT)
Dept: INTERNAL MEDICINE CLINIC | Age: 43
End: 2023-04-27
Payer: COMMERCIAL

## 2023-04-27 VITALS
WEIGHT: 158.2 LBS | OXYGEN SATURATION: 97 % | HEART RATE: 66 BPM | HEIGHT: 63 IN | SYSTOLIC BLOOD PRESSURE: 126 MMHG | BODY MASS INDEX: 28.03 KG/M2 | DIASTOLIC BLOOD PRESSURE: 80 MMHG | RESPIRATION RATE: 20 BRPM | TEMPERATURE: 98.4 F

## 2023-04-27 DIAGNOSIS — Z23 NEED FOR DIPHTHERIA-TETANUS-PERTUSSIS (TDAP) VACCINE: ICD-10-CM

## 2023-04-27 DIAGNOSIS — T24.232A PARTIAL THICKNESS BURN OF LEFT LOWER LEG, INITIAL ENCOUNTER: Primary | ICD-10-CM

## 2023-04-27 PROCEDURE — 99213 OFFICE O/P EST LOW 20 MIN: CPT | Performed by: NURSE PRACTITIONER

## 2023-04-27 PROCEDURE — 90715 TDAP VACCINE 7 YRS/> IM: CPT | Performed by: NURSE PRACTITIONER

## 2023-04-27 RX ORDER — SULFAMETHOXAZOLE AND TRIMETHOPRIM 800; 160 MG/1; MG/1
1 TABLET ORAL 2 TIMES DAILY
Qty: 14 TABLET | Refills: 0 | Status: SHIPPED | OUTPATIENT
Start: 2023-04-27 | End: 2023-05-04
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
## 2023-06-23 ENCOUNTER — TELEPHONE (OUTPATIENT)
Age: 43
End: 2023-06-23

## 2023-06-27 RX ORDER — SUCRALFATE ORAL 1 G/10ML
SUSPENSION ORAL
Qty: 1200 ML | OUTPATIENT
Start: 2023-06-27

## 2023-10-13 ENCOUNTER — NURSE TRIAGE (OUTPATIENT)
Dept: OTHER | Facility: CLINIC | Age: 43
End: 2023-10-13

## 2023-10-13 NOTE — TELEPHONE ENCOUNTER
Location of patient: [facility]    Received call from Mervat grier at [facility] with [employer]. Subjective: Caller states \"I am having black stools\"     Current Symptoms: Stools are black and greasy. History of bleeding ulcers and iron deficiency. Was getting iron infusions and not had one since May. Fatigue. Onset: 1 month ago    Pain Severity: 0/10; N/A; none    Temperature: denies fever     Recommended disposition: Go to ED Now    Care advice provided, patient verbalizes understanding; denies any other questions or concerns; instructed to call back for any new or worsening symptoms. Patient/Caller agrees with recommended disposition; writer provided warm transfer to Meiaoju at [facility] for appointment scheduling    Attention Provider: Thank you for allowing me to participate in the care of your patient. The patient was connected to triage in response to information provided to the ECC/PSC. Please do not respond through this encounter as the response is not directed to a shared pool.       Reason for Disposition   Bloody, black, or tarry bowel movements  (Exception: Chronic-unchanged black-grey bowel movements and is taking iron pills or Pepto-Bismol.)    Protocols used: Rectal Bleeding-ADULT-[place]

## 2023-11-21 ENCOUNTER — TELEPHONE (OUTPATIENT)
Dept: INTERNAL MEDICINE CLINIC | Facility: CLINIC | Age: 43
End: 2023-11-21

## 2023-11-21 NOTE — TELEPHONE ENCOUNTER
Attempt to notify patient of missed appointment with Dr. Dariela Neri was unsuccessful, voicemail to return call was made so the appointment can be rescheduled. I will also reach out to patient via [employer] if applicable to reschedule missed appointment.     No Show letter will be sent

## (undated) DEVICE — FCPS RAD JAW 4LC 240CM W/NDL -- BX/40

## (undated) DEVICE — TUBING HYDR IRR --